# Patient Record
Sex: MALE | Race: WHITE | NOT HISPANIC OR LATINO | URBAN - METROPOLITAN AREA
[De-identification: names, ages, dates, MRNs, and addresses within clinical notes are randomized per-mention and may not be internally consistent; named-entity substitution may affect disease eponyms.]

---

## 2022-12-13 ENCOUNTER — APPOINTMENT (OUTPATIENT)
Dept: RADIOLOGY | Facility: MEDICAL CENTER | Age: 77
End: 2022-12-13
Attending: EMERGENCY MEDICINE
Payer: MEDICARE

## 2022-12-13 ENCOUNTER — HOSPITAL ENCOUNTER (OUTPATIENT)
Facility: MEDICAL CENTER | Age: 77
End: 2022-12-14
Attending: EMERGENCY MEDICINE | Admitting: INTERNAL MEDICINE
Payer: MEDICARE

## 2022-12-13 ENCOUNTER — APPOINTMENT (OUTPATIENT)
Dept: RADIOLOGY | Facility: MEDICAL CENTER | Age: 77
End: 2022-12-13
Payer: MEDICARE

## 2022-12-13 DIAGNOSIS — J44.9 CHRONIC OBSTRUCTIVE PULMONARY DISEASE, UNSPECIFIED COPD TYPE (HCC): ICD-10-CM

## 2022-12-13 DIAGNOSIS — R52 PAIN: ICD-10-CM

## 2022-12-13 DIAGNOSIS — H04.123 DRY EYES, BILATERAL: ICD-10-CM

## 2022-12-13 DIAGNOSIS — R11.0 NAUSEA: ICD-10-CM

## 2022-12-13 DIAGNOSIS — I10 PRIMARY HYPERTENSION: ICD-10-CM

## 2022-12-13 DIAGNOSIS — R41.0 CONFUSION: ICD-10-CM

## 2022-12-13 DIAGNOSIS — C85.90 LYMPHOMA, UNSPECIFIED BODY REGION, UNSPECIFIED LYMPHOMA TYPE (HCC): ICD-10-CM

## 2022-12-13 PROBLEM — R41.82 ALTERED MENTAL STATE: Status: ACTIVE | Noted: 2022-12-13

## 2022-12-13 PROBLEM — R41.82 ALTERED MENTAL STATUS: Status: ACTIVE | Noted: 2022-12-13

## 2022-12-13 PROBLEM — R09.02 HYPOXIA: Status: ACTIVE | Noted: 2022-12-13

## 2022-12-13 LAB
ABO + RH BLD: NORMAL
ABO GROUP BLD: NORMAL
ALBUMIN SERPL BCP-MCNC: 3.7 G/DL (ref 3.2–4.9)
ALBUMIN/GLOB SERPL: 1.4 G/DL
ALP SERPL-CCNC: 74 U/L (ref 30–99)
ALT SERPL-CCNC: 30 U/L (ref 2–50)
AMPHET UR QL SCN: NEGATIVE
ANION GAP SERPL CALC-SCNC: 8 MMOL/L (ref 7–16)
APPEARANCE UR: CLEAR
APTT PPP: 36.3 SEC (ref 24.7–36)
AST SERPL-CCNC: 36 U/L (ref 12–45)
BARBITURATES UR QL SCN: NEGATIVE
BASE EXCESS BLDA CALC-SCNC: 3 MMOL/L (ref -4–3)
BASOPHILS # BLD AUTO: 0.2 % (ref 0–1.8)
BASOPHILS # BLD: 0.02 K/UL (ref 0–0.12)
BENZODIAZ UR QL SCN: NEGATIVE
BILIRUB SERPL-MCNC: 0.2 MG/DL (ref 0.1–1.5)
BILIRUB UR QL STRIP.AUTO: NEGATIVE
BLD GP AB SCN SERPL QL: NORMAL
BODY TEMPERATURE: 37 CENTIGRADE
BUN SERPL-MCNC: 14 MG/DL (ref 8–22)
BZE UR QL SCN: NEGATIVE
CALCIUM ALBUM COR SERPL-MCNC: 9.5 MG/DL (ref 8.5–10.5)
CALCIUM SERPL-MCNC: 9.3 MG/DL (ref 8.5–10.5)
CANNABINOIDS UR QL SCN: POSITIVE
CHLORIDE SERPL-SCNC: 98 MMOL/L (ref 96–112)
CO2 SERPL-SCNC: 28 MMOL/L (ref 20–33)
COLOR UR: YELLOW
CREAT SERPL-MCNC: 0.75 MG/DL (ref 0.5–1.4)
EKG IMPRESSION: NORMAL
EOSINOPHIL # BLD AUTO: 0.01 K/UL (ref 0–0.51)
EOSINOPHIL NFR BLD: 0.1 % (ref 0–6.9)
ERYTHROCYTE [DISTWIDTH] IN BLOOD BY AUTOMATED COUNT: 58.2 FL (ref 35.9–50)
ETHANOL BLD-MCNC: <10.1 MG/DL
ETHANOL BLD-MCNC: <10.1 MG/DL
GFR SERPLBLD CREATININE-BSD FMLA CKD-EPI: 93 ML/MIN/1.73 M 2
GLOBULIN SER CALC-MCNC: 2.7 G/DL (ref 1.9–3.5)
GLUCOSE BLD STRIP.AUTO-MCNC: 101 MG/DL (ref 65–99)
GLUCOSE SERPL-MCNC: 122 MG/DL (ref 65–99)
GLUCOSE UR STRIP.AUTO-MCNC: NEGATIVE MG/DL
HCO3 BLDA-SCNC: 28 MMOL/L (ref 17–25)
HCT VFR BLD AUTO: 31.2 % (ref 42–52)
HGB BLD-MCNC: 10.4 G/DL (ref 14–18)
IMM GRANULOCYTES # BLD AUTO: 0.12 K/UL (ref 0–0.11)
IMM GRANULOCYTES NFR BLD AUTO: 1.3 % (ref 0–0.9)
INR PPP: 1.06 (ref 0.87–1.13)
INR PPP: 1.64 (ref 0.87–1.13)
KETONES UR STRIP.AUTO-MCNC: ABNORMAL MG/DL
LACTATE SERPL-SCNC: 0.9 MMOL/L (ref 0.5–2)
LACTATE SERPL-SCNC: 1 MMOL/L (ref 0.5–2)
LEUKOCYTE ESTERASE UR QL STRIP.AUTO: NEGATIVE
LYMPHOCYTES # BLD AUTO: 0.56 K/UL (ref 1–4.8)
LYMPHOCYTES NFR BLD: 6.2 % (ref 22–41)
MCH RBC QN AUTO: 32.5 PG (ref 27–33)
MCHC RBC AUTO-ENTMCNC: 33.3 G/DL (ref 33.7–35.3)
MCV RBC AUTO: 97.5 FL (ref 81.4–97.8)
METHADONE UR QL SCN: NEGATIVE
MICRO URNS: ABNORMAL
MONOCYTES # BLD AUTO: 0.81 K/UL (ref 0–0.85)
MONOCYTES NFR BLD AUTO: 8.9 % (ref 0–13.4)
NEUTROPHILS # BLD AUTO: 7.55 K/UL (ref 1.82–7.42)
NEUTROPHILS NFR BLD: 83.3 % (ref 44–72)
NITRITE UR QL STRIP.AUTO: NEGATIVE
NRBC # BLD AUTO: 0.06 K/UL
NRBC BLD-RTO: 0.7 /100 WBC
OPIATES UR QL SCN: POSITIVE
OXYCODONE UR QL SCN: NEGATIVE
PCO2 BLDA: 46.2 MMHG (ref 26–37)
PCP UR QL SCN: NEGATIVE
PH BLDA: 7.4 [PH] (ref 7.4–7.5)
PH UR STRIP.AUTO: 5 [PH] (ref 5–8)
PLATELET # BLD AUTO: 104 K/UL (ref 164–446)
PMV BLD AUTO: 9.9 FL (ref 9–12.9)
PO2 BLDA: 38.1 MMHG (ref 64–87)
POTASSIUM SERPL-SCNC: 4.2 MMOL/L (ref 3.6–5.5)
PROCALCITONIN SERPL-MCNC: 0.11 NG/ML
PROPOXYPH UR QL SCN: NEGATIVE
PROT SERPL-MCNC: 6.4 G/DL (ref 6–8.2)
PROT UR QL STRIP: NEGATIVE MG/DL
PROTHROMBIN TIME: 13.7 SEC (ref 12–14.6)
PROTHROMBIN TIME: 19 SEC (ref 12–14.6)
RBC # BLD AUTO: 3.2 M/UL (ref 4.7–6.1)
RBC UR QL AUTO: NEGATIVE
RH BLD: NORMAL
SAO2 % BLDA: 69.2 % (ref 93–99)
SODIUM SERPL-SCNC: 134 MMOL/L (ref 135–145)
SP GR UR STRIP.AUTO: >=1.045
TROPONIN T SERPL-MCNC: 49 NG/L (ref 6–19)
TROPONIN T SERPL-MCNC: 49 NG/L (ref 6–19)
UROBILINOGEN UR STRIP.AUTO-MCNC: 0.2 MG/DL
WBC # BLD AUTO: 9.1 K/UL (ref 4.8–10.8)

## 2022-12-13 PROCEDURE — 85025 COMPLETE CBC W/AUTO DIFF WBC: CPT

## 2022-12-13 PROCEDURE — 84145 PROCALCITONIN (PCT): CPT

## 2022-12-13 PROCEDURE — 82077 ASSAY SPEC XCP UR&BREATH IA: CPT | Mod: 91

## 2022-12-13 PROCEDURE — 83605 ASSAY OF LACTIC ACID: CPT

## 2022-12-13 PROCEDURE — 70498 CT ANGIOGRAPHY NECK: CPT

## 2022-12-13 PROCEDURE — 700102 HCHG RX REV CODE 250 W/ 637 OVERRIDE(OP): Performed by: INTERNAL MEDICINE

## 2022-12-13 PROCEDURE — 82803 BLOOD GASES ANY COMBINATION: CPT

## 2022-12-13 PROCEDURE — 0042T CT-CEREBRAL PERFUSION ANALYSIS: CPT

## 2022-12-13 PROCEDURE — 82962 GLUCOSE BLOOD TEST: CPT

## 2022-12-13 PROCEDURE — 95816 EEG AWAKE AND DROWSY: CPT | Performed by: STUDENT IN AN ORGANIZED HEALTH CARE EDUCATION/TRAINING PROGRAM

## 2022-12-13 PROCEDURE — 86850 RBC ANTIBODY SCREEN: CPT

## 2022-12-13 PROCEDURE — 85730 THROMBOPLASTIN TIME PARTIAL: CPT

## 2022-12-13 PROCEDURE — G0378 HOSPITAL OBSERVATION PER HR: HCPCS

## 2022-12-13 PROCEDURE — 87040 BLOOD CULTURE FOR BACTERIA: CPT | Mod: 91

## 2022-12-13 PROCEDURE — 700117 HCHG RX CONTRAST REV CODE 255: Performed by: EMERGENCY MEDICINE

## 2022-12-13 PROCEDURE — 70450 CT HEAD/BRAIN W/O DYE: CPT

## 2022-12-13 PROCEDURE — 86901 BLOOD TYPING SEROLOGIC RH(D): CPT

## 2022-12-13 PROCEDURE — 84484 ASSAY OF TROPONIN QUANT: CPT

## 2022-12-13 PROCEDURE — 71045 X-RAY EXAM CHEST 1 VIEW: CPT

## 2022-12-13 PROCEDURE — 36415 COLL VENOUS BLD VENIPUNCTURE: CPT

## 2022-12-13 PROCEDURE — 99285 EMERGENCY DEPT VISIT HI MDM: CPT

## 2022-12-13 PROCEDURE — 95816 EEG AWAKE AND DROWSY: CPT | Mod: 26 | Performed by: STUDENT IN AN ORGANIZED HEALTH CARE EDUCATION/TRAINING PROGRAM

## 2022-12-13 PROCEDURE — 70496 CT ANGIOGRAPHY HEAD: CPT

## 2022-12-13 PROCEDURE — 99220 PR INITIAL OBSERVATION CARE,LEVL III: CPT | Performed by: INTERNAL MEDICINE

## 2022-12-13 PROCEDURE — A9270 NON-COVERED ITEM OR SERVICE: HCPCS | Performed by: INTERNAL MEDICINE

## 2022-12-13 PROCEDURE — 81003 URINALYSIS AUTO W/O SCOPE: CPT | Mod: XU

## 2022-12-13 PROCEDURE — 80307 DRUG TEST PRSMV CHEM ANLYZR: CPT

## 2022-12-13 PROCEDURE — 86900 BLOOD TYPING SEROLOGIC ABO: CPT

## 2022-12-13 PROCEDURE — 80053 COMPREHEN METABOLIC PANEL: CPT

## 2022-12-13 PROCEDURE — 93005 ELECTROCARDIOGRAM TRACING: CPT | Performed by: EMERGENCY MEDICINE

## 2022-12-13 PROCEDURE — 85610 PROTHROMBIN TIME: CPT

## 2022-12-13 RX ORDER — DILTIAZEM HYDROCHLORIDE 240 MG/1
240 CAPSULE, COATED, EXTENDED RELEASE ORAL DAILY
Status: DISCONTINUED | OUTPATIENT
Start: 2022-12-13 | End: 2022-12-14 | Stop reason: HOSPADM

## 2022-12-13 RX ORDER — HYDROCODONE BITARTRATE AND ACETAMINOPHEN 10; 325 MG/1; MG/1
1-2 TABLET ORAL EVERY 6 HOURS PRN
Status: DISCONTINUED | OUTPATIENT
Start: 2022-12-13 | End: 2022-12-14 | Stop reason: HOSPADM

## 2022-12-13 RX ORDER — ESCITALOPRAM OXALATE 10 MG/1
10 TABLET ORAL DAILY
COMMUNITY

## 2022-12-13 RX ORDER — ACETAMINOPHEN 500 MG
1000 TABLET ORAL EVERY 6 HOURS PRN
Status: ON HOLD | COMMUNITY
End: 2022-12-14

## 2022-12-13 RX ORDER — POLYETHYLENE GLYCOL 3350 17 G/17G
1 POWDER, FOR SOLUTION ORAL
Status: DISCONTINUED | OUTPATIENT
Start: 2022-12-13 | End: 2022-12-14 | Stop reason: HOSPADM

## 2022-12-13 RX ORDER — DILTIAZEM HYDROCHLORIDE 240 MG/1
240 CAPSULE, COATED, EXTENDED RELEASE ORAL DAILY
Status: DISCONTINUED | OUTPATIENT
Start: 2022-12-14 | End: 2022-12-13

## 2022-12-13 RX ORDER — TIOTROPIUM BROMIDE AND OLODATEROL 3.124; 2.736 UG/1; UG/1
2 SPRAY, METERED RESPIRATORY (INHALATION) DAILY
COMMUNITY

## 2022-12-13 RX ORDER — ACETAMINOPHEN 325 MG/1
650 TABLET ORAL EVERY 6 HOURS PRN
Status: DISCONTINUED | OUTPATIENT
Start: 2022-12-13 | End: 2022-12-14 | Stop reason: HOSPADM

## 2022-12-13 RX ORDER — ONDANSETRON 8 MG/1
8 TABLET, ORALLY DISINTEGRATING ORAL EVERY 8 HOURS PRN
Status: ON HOLD | COMMUNITY
End: 2022-12-14

## 2022-12-13 RX ORDER — BUDESONIDE AND FORMOTEROL FUMARATE DIHYDRATE 80; 4.5 UG/1; UG/1
2 AEROSOL RESPIRATORY (INHALATION)
Status: DISCONTINUED | OUTPATIENT
Start: 2022-12-13 | End: 2022-12-14 | Stop reason: HOSPADM

## 2022-12-13 RX ORDER — DIGOXIN 125 MCG
125 TABLET ORAL DAILY
COMMUNITY

## 2022-12-13 RX ORDER — ATORVASTATIN CALCIUM 40 MG/1
40 TABLET, FILM COATED ORAL NIGHTLY
Status: DISCONTINUED | OUTPATIENT
Start: 2022-12-13 | End: 2022-12-14 | Stop reason: HOSPADM

## 2022-12-13 RX ORDER — ALBUTEROL SULFATE 90 UG/1
1-2 AEROSOL, METERED RESPIRATORY (INHALATION) EVERY 4 HOURS PRN
Status: DISCONTINUED | OUTPATIENT
Start: 2022-12-13 | End: 2022-12-14 | Stop reason: HOSPADM

## 2022-12-13 RX ORDER — ALBUTEROL SULFATE 90 UG/1
1-2 AEROSOL, METERED RESPIRATORY (INHALATION) EVERY 4 HOURS PRN
Status: ON HOLD | COMMUNITY
End: 2022-12-14

## 2022-12-13 RX ORDER — DABIGATRAN ETEXILATE 150 MG/1
150 CAPSULE ORAL 2 TIMES DAILY
Status: ON HOLD | COMMUNITY
End: 2022-12-14

## 2022-12-13 RX ORDER — DIGOXIN 125 MCG
125 TABLET ORAL DAILY
Status: DISCONTINUED | OUTPATIENT
Start: 2022-12-13 | End: 2022-12-14 | Stop reason: HOSPADM

## 2022-12-13 RX ORDER — DIGOXIN 125 MCG
125 TABLET ORAL DAILY
Status: DISCONTINUED | OUTPATIENT
Start: 2022-12-14 | End: 2022-12-13

## 2022-12-13 RX ORDER — ONDANSETRON 2 MG/ML
4 INJECTION INTRAMUSCULAR; INTRAVENOUS EVERY 4 HOURS PRN
Status: DISCONTINUED | OUTPATIENT
Start: 2022-12-13 | End: 2022-12-14 | Stop reason: HOSPADM

## 2022-12-13 RX ORDER — DILTIAZEM HYDROCHLORIDE 240 MG/1
240 CAPSULE, COATED, EXTENDED RELEASE ORAL DAILY
Status: ON HOLD | COMMUNITY
End: 2022-12-14

## 2022-12-13 RX ORDER — HYDROCODONE BITARTRATE AND ACETAMINOPHEN 10; 325 MG/1; MG/1
1-2 TABLET ORAL EVERY 6 HOURS PRN
Status: ON HOLD | COMMUNITY
End: 2022-12-14

## 2022-12-13 RX ORDER — ESCITALOPRAM OXALATE 10 MG/1
10 TABLET ORAL DAILY
Status: DISCONTINUED | OUTPATIENT
Start: 2022-12-14 | End: 2022-12-14 | Stop reason: HOSPADM

## 2022-12-13 RX ORDER — AMOXICILLIN 250 MG
2 CAPSULE ORAL 2 TIMES DAILY
Status: DISCONTINUED | OUTPATIENT
Start: 2022-12-13 | End: 2022-12-14 | Stop reason: HOSPADM

## 2022-12-13 RX ORDER — BISACODYL 10 MG
10 SUPPOSITORY, RECTAL RECTAL
Status: DISCONTINUED | OUTPATIENT
Start: 2022-12-13 | End: 2022-12-14 | Stop reason: HOSPADM

## 2022-12-13 RX ORDER — ENOXAPARIN SODIUM 100 MG/ML
40 INJECTION SUBCUTANEOUS DAILY
Status: DISCONTINUED | OUTPATIENT
Start: 2022-12-13 | End: 2022-12-13

## 2022-12-13 RX ORDER — ATORVASTATIN CALCIUM 40 MG/1
40 TABLET, FILM COATED ORAL NIGHTLY
COMMUNITY

## 2022-12-13 RX ORDER — DABIGATRAN ETEXILATE 150 MG/1
150 CAPSULE ORAL 2 TIMES DAILY
Status: DISCONTINUED | OUTPATIENT
Start: 2022-12-13 | End: 2022-12-13

## 2022-12-13 RX ORDER — ONDANSETRON 4 MG/1
4 TABLET, ORALLY DISINTEGRATING ORAL EVERY 4 HOURS PRN
Status: DISCONTINUED | OUTPATIENT
Start: 2022-12-13 | End: 2022-12-14 | Stop reason: HOSPADM

## 2022-12-13 RX ORDER — DABIGATRAN ETEXILATE 150 MG/1
150 CAPSULE ORAL 2 TIMES DAILY
Status: DISCONTINUED | OUTPATIENT
Start: 2022-12-13 | End: 2022-12-14 | Stop reason: HOSPADM

## 2022-12-13 RX ADMIN — DABIGATRAN ETEXILATE MESYLATE 150 MG: 150 CAPSULE ORAL at 19:50

## 2022-12-13 RX ADMIN — ATORVASTATIN CALCIUM 40 MG: 40 TABLET, FILM COATED ORAL at 21:30

## 2022-12-13 RX ADMIN — DILTIAZEM HYDROCHLORIDE 240 MG: 240 CAPSULE, COATED, EXTENDED RELEASE ORAL at 21:30

## 2022-12-13 RX ADMIN — IOHEXOL 40 ML: 350 INJECTION, SOLUTION INTRAVENOUS at 13:49

## 2022-12-13 RX ADMIN — HYDROCODONE BITARTRATE AND ACETAMINOPHEN 1 TABLET: 10; 325 TABLET ORAL at 19:51

## 2022-12-13 RX ADMIN — IOHEXOL 100 ML: 350 INJECTION, SOLUTION INTRAVENOUS at 13:46

## 2022-12-13 RX ADMIN — DIGOXIN 125 MCG: 0.12 TABLET ORAL at 21:31

## 2022-12-13 ASSESSMENT — ENCOUNTER SYMPTOMS
CARDIOVASCULAR NEGATIVE: 1
FEVER: 0
RESPIRATORY NEGATIVE: 1
CHILLS: 0

## 2022-12-13 ASSESSMENT — LIFESTYLE VARIABLES
TOTAL SCORE: 0
ALCOHOL_USE: NO
EVER HAD A DRINK FIRST THING IN THE MORNING TO STEADY YOUR NERVES TO GET RID OF A HANGOVER: NO
EVER FELT BAD OR GUILTY ABOUT YOUR DRINKING: NO
DOES PATIENT WANT TO STOP DRINKING: NO
TOTAL SCORE: 0
ON A TYPICAL DAY WHEN YOU DRINK ALCOHOL HOW MANY DRINKS DO YOU HAVE: 0
HAVE PEOPLE ANNOYED YOU BY CRITICIZING YOUR DRINKING: NO
AVERAGE NUMBER OF DAYS PER WEEK YOU HAVE A DRINK CONTAINING ALCOHOL: 0
HOW MANY TIMES IN THE PAST YEAR HAVE YOU HAD 5 OR MORE DRINKS IN A DAY: 0
CONSUMPTION TOTAL: NEGATIVE
HAVE YOU EVER FELT YOU SHOULD CUT DOWN ON YOUR DRINKING: NO
TOTAL SCORE: 0

## 2022-12-13 ASSESSMENT — PAIN DESCRIPTION - PAIN TYPE
TYPE: ACUTE PAIN
TYPE: ACUTE PAIN

## 2022-12-13 ASSESSMENT — PATIENT HEALTH QUESTIONNAIRE - PHQ9
SUM OF ALL RESPONSES TO PHQ9 QUESTIONS 1 AND 2: 0
1. LITTLE INTEREST OR PLEASURE IN DOING THINGS: NOT AT ALL
2. FEELING DOWN, DEPRESSED, IRRITABLE, OR HOPELESS: NOT AT ALL

## 2022-12-13 ASSESSMENT — FIBROSIS 4 INDEX: FIB4 SCORE: 4.87

## 2022-12-13 NOTE — ED PROVIDER NOTES
ER Provider Note    Scribed for FERNANDEZ SUE by Marylin Anderson. 12/13/2022  12:13 PM    Primary Care Provider: None  Means of Arrival:  EMS  History obtained from: patient      CHIEF COMPLAINT  Chief Complaint   Patient presents with    ALOC     Pt BIB EMS for altered loc.  Currently AxO x1  Typically wears Home O2-3L  Last night wife noticed patient get up to use restroom without O2 on. This morning O2 sat on their home monitor in upper 70's per wife. They increased home O2 to 6L and brought saturation up to high 80's upon EMS arrival.        LIMITATION TO HISTORY   Select: Altered mental status / Confusion    HPI  Aidan Diallo is a 77 y.o. male who presents to the ED complaining of altered level of consciousness. Patient woke up this morning around 9 am which is when his wife noticed he was confused. He was last seen normal last night. Patient is typically alert and oriented. Nursing spoke to patients wife who states he has a history of low oxygen saturation when he travels to places of high elevation. [place] visiting [facility] [place] and wife noticed last night he went to the bathroom and did not have his oxygen on. It is unclear if they put his oxygen back on at this time, but when he woke up and his wife checked his oxygen saturation she found it to be in the upper 70% range. His oxygen was increased from 3 L to 6 L and they called EMS. Nursing states the patient has been A&O x1. He has a chronic facial droop per wife. He is on oxygen secondary to COPD.      OUTSIDE HISTORIAN(S):  Select: EMS  , Significant other (wife), and nursing        REVIEW OF SYSTEMS  Review of Systems   Unable to perform ROS: Mental status change     PAST MEDICAL HISTORY  Past Medical History:   Diagnosis Date    Cancer (HCC)     Chronic obstructive pulmonary disease (HCC)     Hypertension        SURGICAL HISTORY  None noted when reviewed.     FAMILY HISTORY  None noted when reviewed.    SOCIAL HISTORY    None noted when reviewed.     CURRENT MEDICATIONS  No current outpatient medications     ALLERGIES  Patient has no known allergies.    PHYSICAL EXAM  Physical Exam  Constitutional: Well developed, Well nourished, mild distress, Non-toxic appearance.   HENT: Normocephalic, Large cancerous growth left side of scalp, Bilateral external ears normal, Oropharynx moist, No oral exudates.   Eyes: PERRLA, EOMI, Conjunctiva normal, No discharge.   Neck: No tenderness, Supple, No stridor.   Lymphatic: No lymphadenopathy noted.   Cardiovascular: Normal heart rate, Normal rhythm.   Thorax & Lungs: Clear to auscultation bilaterally, No respiratory distress, No wheezing, No crackles.   Abdomen: Soft, No tenderness, No masses, No pulsatile masses.   Skin: Warm, Dry, No erythema, No rash.   Extremities:, No edema No cyanosis.   Musculoskeletal: No tenderness to palpation or major deformities noted.  Intact distal pulses  Neurologic: Awake alert disoriented left-sided facial droop,   psychiatric: Affect normal, Judgment normal, Mood normal.       VITAL SIGNS:   BP (!) 144/73   Pulse 100   Temp 36.7 °C (98.1 °F) (Temporal)   Resp 17   Ht 1.829 m (6')   Wt 88.5 kg (195 lb)   SpO2 93%   BMI 26.45 kg/m²       DIAGNOSTIC STUDIES      Labs:   Results for orders placed or performed during the hospital encounter of 12/13/22   CBC with Differential   Result Value Ref Range    WBC 9.1 4.8 - 10.8 K/uL    RBC 3.20 (L) 4.70 - 6.10 M/uL    Hemoglobin 10.4 (L) 14.0 - 18.0 g/dL    Hematocrit 31.2 (L) 42.0 - 52.0 %    MCV 97.5 81.4 - 97.8 fL    MCH 32.5 27.0 - 33.0 pg    MCHC 33.3 (L) 33.7 - 35.3 g/dL    RDW 58.2 (H) 35.9 - 50.0 fL    Platelet Count 104 (L) 164 - 446 K/uL    MPV 9.9 9.0 - 12.9 fL    Neutrophils-Polys 83.30 (H) 44.00 - 72.00 %    Lymphocytes 6.20 (L) 22.00 - 41.00 %    Monocytes 8.90 0.00 - 13.40 %    Eosinophils 0.10 0.00 - 6.90 %    Basophils 0.20 0.00 - 1.80 %    Immature Granulocytes 1.30 (H) 0.00 - 0.90 %    Nucleated RBC  0.70 /100 WBC    Neutrophils (Absolute) 7.55 (H) 1.82 - 7.42 K/uL    Lymphs (Absolute) 0.56 (L) 1.00 - 4.80 K/uL    Monos (Absolute) 0.81 0.00 - 0.85 K/uL    Eos (Absolute) 0.01 0.00 - 0.51 K/uL    Baso (Absolute) 0.02 0.00 - 0.12 K/uL    Immature Granulocytes (abs) 0.12 (H) 0.00 - 0.11 K/uL    NRBC (Absolute) 0.06 K/uL   Comp Metabolic Panel   Result Value Ref Range    Sodium 134 (L) 135 - 145 mmol/L    Potassium 4.2 3.6 - 5.5 mmol/L    Chloride 98 96 - 112 mmol/L    Co2 28 20 - 33 mmol/L    Anion Gap 8.0 7.0 - 16.0    Glucose 122 (H) 65 - 99 mg/dL    Bun 14 8 - 22 mg/dL    Creatinine 0.75 0.50 - 1.40 mg/dL    Calcium 9.3 8.5 - 10.5 mg/dL    AST(SGOT) 36 12 - 45 U/L    ALT(SGPT) 30 2 - 50 U/L    Alkaline Phosphatase 74 30 - 99 U/L    Total Bilirubin 0.2 0.1 - 1.5 mg/dL    Albumin 3.7 3.2 - 4.9 g/dL    Total Protein 6.4 6.0 - 8.2 g/dL    Globulin 2.7 1.9 - 3.5 g/dL    A-G Ratio 1.4 g/dL   Diagnostic Alcohol   Result Value Ref Range    Diagnostic Alcohol <10.1 <10.1 mg/dL   Urine Drug Screen (Triage)   Result Value Ref Range    Amphetamines Urine Negative Negative    Barbiturates Negative Negative    Benzodiazepines Negative Negative    Cocaine Metabolite Negative Negative    Methadone Negative Negative    Opiates Positive (A) Negative    Oxycodone Negative Negative    Phencyclidine -Pcp Negative Negative    Propoxyphene Negative Negative    Cannabinoid Metab Positive (A) Negative   ESTIMATED GFR   Result Value Ref Range    GFR (CKD-EPI) 93 >60 mL/min/1.73 m 2   CORRECTED CALCIUM   Result Value Ref Range    Correct Calcium 9.5 8.5 - 10.5 mg/dL   PROTHROMBIN TIME   Result Value Ref Range    PT 13.7 12.0 - 14.6 sec    INR 1.06 0.87 - 1.13   APTT   Result Value Ref Range    APTT 36.3 (H) 24.7 - 36.0 sec   TROPONIN   Result Value Ref Range    Troponin T 49 (H) 6 - 19 ng/L   DIAGNOSTIC ALCOHOL   Result Value Ref Range    Diagnostic Alcohol <10.1 <10.1 mg/dL   EKG (NOW)   Result Value Ref Range    Report       [facility] Emergency Dept.    Test Date:  2022  Pt Name:    GENE JADE                 Department: ER  MRN:        [id number]                      Room:       BL 22  Gender:     Male                         Technician: [id number]  :        1945                   Requested By:FERNANDEZ SUE  Order #:    [id number]                    Reading MD: FERNANDEZ SUE MD    Measurements  Intervals                                Axis  Rate:       98                           P:          54  MT:         162                          QRS:        -11  QRSD:       104                          T:          64  QT:         333  QTc:        426    Interpretive Statements  Sinus rhythm  Borderline low voltage, extremity leads  No previous ECG available for comparison  Electronically Signed On 2022 14:15:14 PST by FERNANDEZ SUE MD     POCT glucose device results   Result Value Ref Range    POC Glucose, Blood 101 (H) 65 - 99 mg/dL     All labs reviewed by me.     Radiology:   CT-CEREBRAL PERFUSION ANALYSIS   Final Result      1.  Cerebral blood flow less than 30% likely representing completed infarct = 0 mL.      2.  T Max more than 6 seconds likely representing combination of completed infarct and ischemia = 16 mL.      3.  Mismatched volume likely representing ischemic brain/penumbra = 16.      Please note that the cerebral perfusion was performed on the limited brain tissue around the basal ganglia region. Infarct/ischemia outside the CT perfusion sections can be missed in this study.      CT-CTA NECK WITH & W/O-POST PROCESSING   Final Result      1.  CT angiogram of the neck within normal limits.   2.  Conglomerate right-sided cervical adenopathy suspicious for malignancy.      CT-CTA HEAD WITH & W/O-POST PROCESS   Final Result      1.  No large vessel occlusion, hemodynamically significant stenosis or aneurysm.   2.  Large heterogeneous right scalp and postauricular mass in keeping with  malignancy.      CT-HEAD W/O   Final Result      1.  Large right parietotemporal occipital scalp mass extending posterior to the right ear and in the posterior right upper neck.   2.  No acute intracranial abnormality.         DX-CHEST-PORTABLE (1 VIEW)   Final Result      No acute pulmonary pulmonary abnormality.      MR-BRAIN-W/O    (Results Pending)      The radiologist's interpretation of all radiological studies have been reviewed by me.    COURSE & MEDICAL DECISION MAKING     Nursing notes, vital signs, PMSFSHx reviewed in chart     Differential diagnoses include but not limited to ACS, acute stroke, metabolic, medication reaction, infection, sepsis, intracranial metastasis.    Prior records reviewed which indicate history of hypertension.     DISCUSSION OF MANAGEMENT WITH OTHER PHYSICIANS, QHP OR APPROPRIATE SOURCE  Select: Admitting team          ESCALATION OF CARE  Evaluated by hospitalist who agrees to admit        PLAN   12:16 PM -  Will order chest x-ray, CBC w/ diff, CMP, diagnostic alcohol, UA, urine drug screen to evaluate his complaints.    2:52 PM Paged hospitalist.    3:15 PM - I discussed the patient's case and the above findings with Dr. Sinclair (Hospitalist) who will consult the patient for admission. Patient care will be transferred at this time.    COURSE  Patient is coming in with acute confusion disorientation of uncertain etiology.  Discussed with the possibility of a stroke therefore safe CT scans were performed, this was unremarkable, work-up here I cannot discern where the patient's confusion is coming from.  Patient will need to be hospitalized, discussed case with the hospitalist for hospitalization.    DISPOSITION   Patient will be hospitalized by Dr. Sinclair    CONDITION AT DISPOSITION  Stable     FINAL IMPRESSION   1. Confusion            I, Marylin Anderson (Sabas), am scribing for, and in the presence of, Aamir Craig M.D..    Electronically signed by: Marylin Lucio),  12/13/2022    IAamir M.D. personally performed the services described in this documentation, as scribed by Marylin Anderson in my presence, and it is both accurate and complete.     The note accurately reflects work and decisions made by me.  Aamir Craig M.D.  12/13/2022  4:30 PM

## 2022-12-13 NOTE — ED TRIAGE NOTES
Chief Complaint   Patient presents with    ALOC     Pt BIB EMS for altered loc.  Currently AxO x1  Typically wears Home O2-3L  Last night wife noticed patient get up to use restroom without O2 on. This morning O2 sat on their home monitor in upper 70's per wife. They increased home O2 to 6L and brought saturation up to high 80's upon EMS arrival.

## 2022-12-13 NOTE — H&P
Hospital Medicine History & Physical Note    Date of Service  12/13/2022    Primary Care Physician  No primary care provider on file.    Consultants  None    Code Status  Full Code    Chief Complaint  Chief Complaint   Patient presents with    ALOC     Pt BIB EMS for altered loc.  Currently AxO x1  Typically wears Home O2-3L  Last night wife noticed patient get up to use restroom without O2 on. This morning O2 sat on their home monitor in upper 70's per wife. They increased home O2 to 6L and brought saturation up to high 80's upon EMS arrival.        History of Presenting Illness      77-year-old male with history of emphysema and lymphoma who presented with family on 12/13 with altered mental status.  Patient lives in [place] and using oxygen as needed, patient is visiting our area and family noticed his saturation around 70% on room air and patient was more lethargic with confusing, patient was transferred to our facility.  Patient was alert to himself only however no agitation, patient denied any pain, according to family they did not notice any signs of fever, no chest pain or shortness of breath and no other symptoms, on admission vital signs were stable and patient needed 4 L nasal cannula, labs did not show any leukocytosis however he has left shift.  No kidney injuries and troponin mildly elevated 49, EKG did not show any ischemic changes or arrhythmia, CT scan for head did not show any acute finding, however showed large right  parietotemporal occipital scalp mass extending posterior to the right ear and in the posterior right upper neck.  Related to his lymphoma.  Patient will be admitted for altered mental status work-up.      Patient has lymphoma around a year, patient received chemotherapy, which was last dose 2 weeks ago.    I discussed the plan of care with patient, family, and bedside RN.    Review of Systems  Review of Systems   Unable to perform ROS: Mental status change   Constitutional:   Negative for chills and fever.   Respiratory: Negative.     Cardiovascular: Negative.    Genitourinary: Negative.      Past Medical History   has a past medical history of Cancer (HCC), Chronic obstructive pulmonary disease (HCC), and Hypertension.    Surgical History  Knee replacement    Family History  Denied any family history of heart disease or lymphoma  Family history reviewed with patient. There is no family history that is pertinent to the chief complaint.     Social History       Allergies  No Known Allergies    Medications  None       Physical Exam  Temp:  [36.7 °C (98.1 °F)-36.9 °C (98.5 °F)] 36.9 °C (98.5 °F)  Pulse:  [] 96  Resp:  [14-20] 14  BP: (140-153)/(73-79) 153/79  SpO2:  [93 %-96 %] 95 %  Blood Pressure : (!) 153/79   Temperature: 36.9 °C (98.5 °F)   Pulse: 96   Respiration: 14   Pulse Oximetry: 95 %       Physical Exam  Constitutional:       Appearance: He is not ill-appearing.   HENT:      Head:      Comments: Lymphadenopathy  Eyes:      General: No scleral icterus.  Cardiovascular:      Rate and Rhythm: Normal rate.      Heart sounds: No murmur heard.  Pulmonary:      Effort: No respiratory distress.      Breath sounds: No wheezing.   Abdominal:      General: There is no distension.      Tenderness: There is no abdominal tenderness. There is no right CVA tenderness, left CVA tenderness or guarding.   Musculoskeletal:      Right lower leg: No edema.      Left lower leg: No edema.   Lymphadenopathy:      Cervical: No cervical adenopathy.   Skin:     Coloration: Skin is not jaundiced.      Findings: No bruising, lesion or rash.   Neurological:      General: No focal deficit present.      Mental Status: He is alert. Mental status is at baseline. He is disoriented.      Cranial Nerves: No cranial nerve deficit.      Motor: No weakness.      Gait: Gait normal.      Comments: Alert to himself however confused about date and place       Laboratory:  Recent Labs     12/13/22  1130   WBC 9.1    RBC 3.20*   HEMOGLOBIN 10.4*   HEMATOCRIT 31.2*   MCV 97.5   MCH 32.5   MCHC 33.3*   RDW 58.2*   PLATELETCT 104*   MPV 9.9     Recent Labs     12/13/22  1130   SODIUM 134*   POTASSIUM 4.2   CHLORIDE 98   CO2 28   GLUCOSE 122*   BUN 14   CREATININE 0.75   CALCIUM 9.3     Recent Labs     12/13/22  1130   ALTSGPT 30   ASTSGOT 36   ALKPHOSPHAT 74   TBILIRUBIN 0.2   GLUCOSE 122*     Recent Labs     12/13/22  1130   APTT 36.3*   INR 1.06     No results for input(s): NTPROBNP in the last 72 hours.      Recent Labs     12/13/22  1130   TROPONINT 49*       Imaging:  CT-CEREBRAL PERFUSION ANALYSIS   Final Result      1.  Cerebral blood flow less than 30% likely representing completed infarct = 0 mL.      2.  T Max more than 6 seconds likely representing combination of completed infarct and ischemia = 16 mL.      3.  Mismatched volume likely representing ischemic brain/penumbra = 16.      Please note that the cerebral perfusion was performed on the limited brain tissue around the basal ganglia region. Infarct/ischemia outside the CT perfusion sections can be missed in this study.      CT-CTA NECK WITH & W/O-POST PROCESSING   Final Result      1.  CT angiogram of the neck within normal limits.   2.  Conglomerate right-sided cervical adenopathy suspicious for malignancy.      CT-CTA HEAD WITH & W/O-POST PROCESS   Final Result      1.  No large vessel occlusion, hemodynamically significant stenosis or aneurysm.   2.  Large heterogeneous right scalp and postauricular mass in keeping with malignancy.      CT-HEAD W/O   Final Result      1.  Large right parietotemporal occipital scalp mass extending posterior to the right ear and in the posterior right upper neck.   2.  No acute intracranial abnormality.         DX-CHEST-PORTABLE (1 VIEW)   Final Result      No acute pulmonary pulmonary abnormality.      MR-BRAIN-W/O    (Results Pending)       X-Ray:  I have personally reviewed the images and compared with prior images.  EKG:  I  have personally reviewed the images and compared with prior images.    Assessment/Plan:  Justification for Admission Status  I anticipate this patient is appropriate for observation status at this time because altered mental status    Patient will need a Med/Surg bed on cardiology service .  The need is secondary to altered mental status and possible stroke.    * Altered mental status- (present on admission)  Assessment & Plan  Patient is alert to himself only, confusing however no agitation  No signs of infection or sepsis  CT scan of her head did not show an acute finding  Will order blood culture and procalcitonin and UA to rule out infection  Could be related to hypoxia  EEG and MRI to rule out any stroke  Continue nonpharmacological treatment to avoid worsening agitation    COPD (chronic obstructive pulmonary disease) ([facility])  Assessment & Plan  Chest x-ray did not show any infiltration, no wheezing or exacerbation  Inhalers as needed  Oxygen therapy    Primary hypertension  Assessment & Plan  Blood pressure uncontrolled, possible related to stress patient is not on any medications,   continue monitoring and start with blood pressure medication if needed      Hypoxia  Assessment & Plan  Patient has COPD  Patient usually uses oxygen as needed  Patient was noticed to have hypoxia 70%  Patient is 4 L nasal cannula  No COPD exacerbation however continue inhalers and oxygen therapy  Patient might need oxygen therapy 24/7    Lymphoma ([facility])  Assessment & Plan  Large B-cell lymphoma  Diagnosed a month ago, patient had remission in June however it came back recently  Recent chemotherapy 2 weeks ago  Follow-up with oncologist as outpatient   CBC around baseline  Rule out infection      VTE prophylaxis: SCDs/TEDs and enoxaparin ppx      Interventions to be considered in all patients in order to minimize the risk of delirium.   -do not disturb patient (vitals or lab draws) between the hours of 10 PM and 6 AM.  -ideally the  patient should not sleep during the day and we should avoid day time naps.   -up in chair for meals  -ambulate at least three times daily, as able  -watch for constipation  -timed voiding - ask patient is she would like to go to the bathroom q 2-3 hours, except during the do not disturb hours.   -remove all necessary lines (central lines, peripheral IVs, feeding tubes, arias catheters)  -unless patient has shown harm to self or others I would recommend against use of restraints - either chemical or physical (antipsychotics)   -minimize polypharmacy, do not dose medication during sleep hours

## 2022-12-13 NOTE — ED NOTES
Lilia- daughter of gene, called his cell phone. With patient's permission we answered it together for some clarification.   Lilia # [phone]    Lilia states patient's normal is AxOx4. Reaffirmed that patient's last known normal was last night 12/12.  Family has been in the area on vacation since Sunday.   I asked the family to come to [facility] provide answers for any additional questions. They will be here around 1400.

## 2022-12-13 NOTE — ED NOTES
UNABLE to address med rec at this time  Pt unable to participate in interview, unable to reach pt's family, no pharmacy on file

## 2022-12-13 NOTE — ED NOTES
Reoriented patient to time, place, and situation.   Swabbed patient's mouth and applied chapstick to lips.   Patient quickly falls back asleep without constant stimulation.  Patient is easily rousable and able to answer questions if they are one at a time but sometimes unable to find the right words.

## 2022-12-14 ENCOUNTER — PHARMACY VISIT (OUTPATIENT)
Dept: PHARMACY | Facility: MEDICAL CENTER | Age: 77
End: 2022-12-14
Payer: COMMERCIAL

## 2022-12-14 VITALS
HEIGHT: 72 IN | HEART RATE: 77 BPM | DIASTOLIC BLOOD PRESSURE: 67 MMHG | WEIGHT: 181.88 LBS | RESPIRATION RATE: 18 BRPM | SYSTOLIC BLOOD PRESSURE: 116 MMHG | BODY MASS INDEX: 24.63 KG/M2 | TEMPERATURE: 98.1 F | OXYGEN SATURATION: 96 %

## 2022-12-14 PROBLEM — R09.02 HYPOXIA: Status: RESOLVED | Noted: 2022-12-13 | Resolved: 2022-12-14

## 2022-12-14 PROBLEM — R41.82 ALTERED MENTAL STATUS: Status: RESOLVED | Noted: 2022-12-13 | Resolved: 2022-12-14

## 2022-12-14 PROBLEM — R41.82 ALTERED MENTAL STATE: Status: RESOLVED | Noted: 2022-12-13 | Resolved: 2022-12-14

## 2022-12-14 LAB
ALBUMIN SERPL BCP-MCNC: 3.3 G/DL (ref 3.2–4.9)
ALBUMIN/GLOB SERPL: 1.4 G/DL
ALP SERPL-CCNC: 64 U/L (ref 30–99)
ALT SERPL-CCNC: 24 U/L (ref 2–50)
ANION GAP SERPL CALC-SCNC: 8 MMOL/L (ref 7–16)
AST SERPL-CCNC: 31 U/L (ref 12–45)
BASOPHILS # BLD AUTO: 0.3 % (ref 0–1.8)
BASOPHILS # BLD: 0.02 K/UL (ref 0–0.12)
BILIRUB SERPL-MCNC: 0.3 MG/DL (ref 0.1–1.5)
BUN SERPL-MCNC: 12 MG/DL (ref 8–22)
CALCIUM ALBUM COR SERPL-MCNC: 9.4 MG/DL (ref 8.5–10.5)
CALCIUM SERPL-MCNC: 8.8 MG/DL (ref 8.5–10.5)
CHLORIDE SERPL-SCNC: 99 MMOL/L (ref 96–112)
CO2 SERPL-SCNC: 29 MMOL/L (ref 20–33)
CREAT SERPL-MCNC: 0.64 MG/DL (ref 0.5–1.4)
CRP SERPL HS-MCNC: 1 MG/DL (ref 0–0.75)
D DIMER PPP IA.FEU-MCNC: 0.57 UG/ML (FEU) (ref 0–0.5)
DIGOXIN SERPL-MCNC: 0.8 NG/ML (ref 0.8–2)
EOSINOPHIL # BLD AUTO: 0.06 K/UL (ref 0–0.51)
EOSINOPHIL NFR BLD: 1 % (ref 0–6.9)
ERYTHROCYTE [DISTWIDTH] IN BLOOD BY AUTOMATED COUNT: 57.6 FL (ref 35.9–50)
GFR SERPLBLD CREATININE-BSD FMLA CKD-EPI: 97 ML/MIN/1.73 M 2
GLOBULIN SER CALC-MCNC: 2.4 G/DL (ref 1.9–3.5)
GLUCOSE SERPL-MCNC: 94 MG/DL (ref 65–99)
HCT VFR BLD AUTO: 29.4 % (ref 42–52)
HGB BLD-MCNC: 10 G/DL (ref 14–18)
IMM GRANULOCYTES # BLD AUTO: 0.06 K/UL (ref 0–0.11)
IMM GRANULOCYTES NFR BLD AUTO: 1 % (ref 0–0.9)
LACTATE SERPL-SCNC: 1.1 MMOL/L (ref 0.5–2)
LYMPHOCYTES # BLD AUTO: 0.48 K/UL (ref 1–4.8)
LYMPHOCYTES NFR BLD: 8.2 % (ref 22–41)
MAGNESIUM SERPL-MCNC: 1.9 MG/DL (ref 1.5–2.5)
MCH RBC QN AUTO: 32.7 PG (ref 27–33)
MCHC RBC AUTO-ENTMCNC: 34 G/DL (ref 33.7–35.3)
MCV RBC AUTO: 96.1 FL (ref 81.4–97.8)
MONOCYTES # BLD AUTO: 1.04 K/UL (ref 0–0.85)
MONOCYTES NFR BLD AUTO: 17.7 % (ref 0–13.4)
NEUTROPHILS # BLD AUTO: 4.2 K/UL (ref 1.82–7.42)
NEUTROPHILS NFR BLD: 71.8 % (ref 44–72)
NRBC # BLD AUTO: 0.02 K/UL
NRBC BLD-RTO: 0.3 /100 WBC
PLATELET # BLD AUTO: 110 K/UL (ref 164–446)
PMV BLD AUTO: 10.9 FL (ref 9–12.9)
POTASSIUM SERPL-SCNC: 3.8 MMOL/L (ref 3.6–5.5)
PROT SERPL-MCNC: 5.7 G/DL (ref 6–8.2)
RBC # BLD AUTO: 3.06 M/UL (ref 4.7–6.1)
SODIUM SERPL-SCNC: 136 MMOL/L (ref 135–145)
TROPONIN T SERPL-MCNC: 37 NG/L (ref 6–19)
WBC # BLD AUTO: 5.9 K/UL (ref 4.8–10.8)

## 2022-12-14 PROCEDURE — 83605 ASSAY OF LACTIC ACID: CPT

## 2022-12-14 PROCEDURE — 85379 FIBRIN DEGRADATION QUANT: CPT

## 2022-12-14 PROCEDURE — 80162 ASSAY OF DIGOXIN TOTAL: CPT

## 2022-12-14 PROCEDURE — A9270 NON-COVERED ITEM OR SERVICE: HCPCS | Performed by: INTERNAL MEDICINE

## 2022-12-14 PROCEDURE — 99217 PR OBSERVATION CARE DISCHARGE: CPT | Mod: FS | Performed by: NURSE PRACTITIONER

## 2022-12-14 PROCEDURE — 86140 C-REACTIVE PROTEIN: CPT

## 2022-12-14 PROCEDURE — A9270 NON-COVERED ITEM OR SERVICE: HCPCS | Performed by: NURSE PRACTITIONER

## 2022-12-14 PROCEDURE — 700102 HCHG RX REV CODE 250 W/ 637 OVERRIDE(OP): Performed by: INTERNAL MEDICINE

## 2022-12-14 PROCEDURE — 84484 ASSAY OF TROPONIN QUANT: CPT

## 2022-12-14 PROCEDURE — 94640 AIRWAY INHALATION TREATMENT: CPT

## 2022-12-14 PROCEDURE — 700102 HCHG RX REV CODE 250 W/ 637 OVERRIDE(OP): Performed by: NURSE PRACTITIONER

## 2022-12-14 PROCEDURE — 80053 COMPREHEN METABOLIC PANEL: CPT

## 2022-12-14 PROCEDURE — 85025 COMPLETE CBC W/AUTO DIFF WBC: CPT

## 2022-12-14 PROCEDURE — RXMED WILLOW AMBULATORY MEDICATION CHARGE: Performed by: NURSE PRACTITIONER

## 2022-12-14 PROCEDURE — 83735 ASSAY OF MAGNESIUM: CPT

## 2022-12-14 PROCEDURE — G0378 HOSPITAL OBSERVATION PER HR: HCPCS

## 2022-12-14 RX ORDER — CARBOXYMETHYLCELLULOSE SODIUM 5 MG/ML
1 SOLUTION/ DROPS OPHTHALMIC PRN
Status: DISCONTINUED | OUTPATIENT
Start: 2022-12-14 | End: 2022-12-14 | Stop reason: HOSPADM

## 2022-12-14 RX ORDER — DILTIAZEM HYDROCHLORIDE 240 MG/1
240 CAPSULE, COATED, EXTENDED RELEASE ORAL DAILY
Qty: 30 CAPSULE | Refills: 1 | Status: SHIPPED | OUTPATIENT
Start: 2022-12-15

## 2022-12-14 RX ORDER — BUDESONIDE AND FORMOTEROL FUMARATE DIHYDRATE 80; 4.5 UG/1; UG/1
2 AEROSOL RESPIRATORY (INHALATION) 2 TIMES DAILY
Qty: 10.2 G | Refills: 0 | Status: SHIPPED | OUTPATIENT
Start: 2022-12-14

## 2022-12-14 RX ORDER — ACETAMINOPHEN 325 MG/1
650 TABLET ORAL EVERY 6 HOURS PRN
Qty: 30 TABLET | Refills: 0 | Status: SHIPPED | OUTPATIENT
Start: 2022-12-14

## 2022-12-14 RX ORDER — ALBUTEROL SULFATE 90 UG/1
1-2 AEROSOL, METERED RESPIRATORY (INHALATION) EVERY 4 HOURS PRN
Qty: 8.5 G | Refills: 1 | Status: SHIPPED | OUTPATIENT
Start: 2022-12-14

## 2022-12-14 RX ORDER — CARBOXYMETHYLCELLULOSE SODIUM 5 MG/ML
1 SOLUTION/ DROPS OPHTHALMIC PRN
Qty: 15 ML | Refills: 1 | Status: SHIPPED | OUTPATIENT
Start: 2022-12-14

## 2022-12-14 RX ORDER — DABIGATRAN ETEXILATE 150 MG/1
150 CAPSULE ORAL 2 TIMES DAILY
Qty: 60 CAPSULE | Refills: 1 | Status: SHIPPED | OUTPATIENT
Start: 2022-12-14

## 2022-12-14 RX ORDER — ONDANSETRON 4 MG/1
4 TABLET, ORALLY DISINTEGRATING ORAL EVERY 4 HOURS PRN
Qty: 10 TABLET | Refills: 0 | Status: SHIPPED | OUTPATIENT
Start: 2022-12-14

## 2022-12-14 RX ADMIN — DIGOXIN 125 MCG: 0.12 TABLET ORAL at 11:55

## 2022-12-14 RX ADMIN — DABIGATRAN ETEXILATE MESYLATE 150 MG: 150 CAPSULE ORAL at 05:27

## 2022-12-14 RX ADMIN — TIOTROPIUM BROMIDE INHALATION SPRAY 5 MCG: 3.12 SPRAY, METERED RESPIRATORY (INHALATION) at 07:40

## 2022-12-14 RX ADMIN — HYDROCODONE BITARTRATE AND ACETAMINOPHEN 1 TABLET: 10; 325 TABLET ORAL at 05:28

## 2022-12-14 RX ADMIN — ESCITALOPRAM OXALATE 10 MG: 10 TABLET ORAL at 05:28

## 2022-12-14 RX ADMIN — ACETAMINOPHEN 650 MG: 325 TABLET, FILM COATED ORAL at 13:44

## 2022-12-14 RX ADMIN — DILTIAZEM HYDROCHLORIDE 240 MG: 240 CAPSULE, COATED, EXTENDED RELEASE ORAL at 11:57

## 2022-12-14 RX ADMIN — BUDESONIDE AND FORMOTEROL FUMARATE DIHYDRATE 2 PUFF: 80; 4.5 AEROSOL RESPIRATORY (INHALATION) at 07:41

## 2022-12-14 RX ADMIN — CARBOXYMETHYLCELLULOSE SODIUM 1 DROP: 5 SOLUTION/ DROPS OPHTHALMIC at 10:08

## 2022-12-14 RX ADMIN — SENNOSIDES AND DOCUSATE SODIUM 2 TABLET: 50; 8.6 TABLET ORAL at 05:28

## 2022-12-14 ASSESSMENT — PAIN DESCRIPTION - PAIN TYPE
TYPE: ACUTE PAIN
TYPE: ACUTE PAIN

## 2022-12-14 NOTE — CARE PLAN
The patient is Stable - Low risk of patient condition declining or worsening    Shift Goals  Clinical Goals: Monitor O2 sats  Patient Goals: Rest, go home today  Family Goals: No family present    Progress made toward(s) clinical / shift goals:    Problem: Knowledge Deficit - Standard  Goal: Patient and family/care givers will demonstrate understanding of plan of care, disease process/condition, diagnostic tests and medications  Outcome: Met     Problem: Hemodynamics  Goal: Patient's hemodynamics, fluid balance and neurologic status will be stable or improve  Outcome: Met     Problem: Fluid Volume  Goal: Fluid volume balance will be maintained  Outcome: Met     Problem: Urinary - Renal Perfusion  Goal: Ability to achieve and maintain adequate renal perfusion and functioning will improve  Outcome: Met     Problem: Respiratory  Goal: Patient will achieve/maintain optimum respiratory ventilation and gas exchange  Outcome: Met     Problem: Physical Regulation  Goal: Diagnostic test results will improve  Outcome: Met  Goal: Signs and symptoms of infection will decrease  Outcome: Met     Problem: Pain - Standard  Goal: Alleviation of pain or a reduction in pain to the patient’s comfort goal  Outcome: Met     All tests have been completed. The patient is alert and oriented with oxygen titrating back to baseline. Patient understands plan upon discharge.     Patient is not progressing towards the following goals:

## 2022-12-14 NOTE — PROGRESS NOTES
Tele Monitor Summary    Sinus Rhythm - Sinus Tach  HR: 76 - 100  Rare PVCs, Rare PACs,   .18/.09/.34

## 2022-12-14 NOTE — PROGRESS NOTES
4 Eyes Skin Assessment Completed by BALDO Gomez and BALDO Lauren.    Head lesion to right side of head  Ears WDL  Nose WDL  Mouth WDL  Neck WDL  Breast/Chest WDL  Shoulder Blades WDL  Spine WDL  (R) Arm/Elbow/Hand WDL  (L) Arm/Elbow/Hand WDL  Abdomen WDL  Groin WDL  Scrotum/Coccyx/Buttocks WDL  (R) Leg WDL  (L) Leg WDL  (R) Heel/Foot/Toe WDL  (L) Heel/Foot/Toe WDL          Devices In Places Tele Box, Blood Pressure Cuff, and Pulse Ox      Interventions In Place Pillows and Pressure Redistribution Mattress    Possible Skin Injury No    Pictures Uploaded Into Epic Yes  Wound Consult Placed N/A  RN Wound Prevention Protocol Ordered No

## 2022-12-14 NOTE — PROGRESS NOTES
Assessment completed. Patient is aox4, vital signs stable. Patient and family updated on plan of care, all needs met at this time. Patient sinus rhythm on tele monitor, HR:76. Threaded socks and bed alarm in place. Bed locked and in lowest position. Call light and personal belongings within reach.

## 2022-12-14 NOTE — CARE PLAN
The patient is Stable - Low risk of patient condition declining or worsening    Shift Goals  Clinical Goals: [place] O2 sats, Pending MRI  Patient Goals: rest and pain control    Progress made toward(s) clinical / shift goals:      Problem: Knowledge Deficit - Standard  Goal: Patient and family/care givers will demonstrate understanding of plan of care, disease process/condition, diagnostic tests and medications  Outcome: Progressing     Problem: Pain - Standard  Goal: Alleviation of pain or a reduction in pain to the patient’s comfort goal  Outcome: Progressing    Patient is not progressing towards the following goals:

## 2022-12-14 NOTE — ASSESSMENT & PLAN NOTE
Patient is alert to himself only, confusing however no agitation  No signs of infection or sepsis  CT scan of her head did not show an acute finding  Will order blood culture and procalcitonin and UA to rule out infection  Could be related to hypoxia  EEG and MRI to rule out any stroke  Continue nonpharmacological treatment to avoid worsening agitation

## 2022-12-14 NOTE — DISCHARGE INSTRUCTIONS
Discharge Instructions    Discharged to home by car with relative. Discharged via wheelchair, hospital escort: Yes.  Special equipment needed: Oxygen    Be sure to schedule a follow-up appointment with your primary care doctor or any specialists as instructed.     Discharge Plan:   Influenza Vaccine Indication: Patient Refuses    I understand that a diet low in cholesterol, fat, and sodium is recommended for good health. Unless I have been given specific instructions below for another diet, I accept this instruction as my diet prescription.   Other diet:     Special Instructions: None    -Is this patient being discharged with medication to prevent blood clots?  No    Is patient discharged on Warfarin / Coumadin?   No       Altered Mental Status  Altered mental status most often refers to an abnormal change in your responsiveness and awareness. It can affect your speech, thought, mobility, memory, attention span, or alertness. It can range from slight confusion to complete unresponsiveness (coma). Altered mental status can be a sign of a serious underlying medical condition. Rapid evaluation and medical treatment is necessary for patients having an altered mental status.  CAUSES   Low blood sugar (hypoglycemia) or diabetes.  Severe loss of body fluids (dehydration) or a body salt (electrolyte) imbalance.  A stroke or other neurologic problem, such as dementia or delirium.  A head injury or tumor.  A drug or alcohol overdose.  Exposure to toxins or poisons.  Depression, anxiety, and stress.  A low oxygen level (hypoxia).  An infection.  Blood loss.  Twitching or shaking (seizure).  Heart problems, such as heart attack or heart rhythm problems (arrhythmias).  A body temperature that is too low or too high (hypothermia or hyperthermia).  DIAGNOSIS   A diagnosis is based on your history, symptoms, physical and neurologic examinations, and diagnostic tests. Diagnostic tests may include:  Measurement of your blood  pressure, pulse, breathing, and oxygen levels (vital signs).  Blood tests.  Urine tests.  X-ray exams.  A computerized magnetic scan (magnetic resonance imaging, MRI).  A computerized X-ray scan (computed tomography, CT scan).  TREATMENT   Treatment will depend on the cause. Treatment may include:  Management of an underlying medical or mental health condition.  Critical care or support in the hospital.  HOME CARE INSTRUCTIONS   Only take over-the-counter or prescription medicines for pain, discomfort, or fever as directed by your caregiver.  Manage underlying conditions as directed by your caregiver.  Eat a healthy, well-balanced diet to maintain strength.  Join a support group or prevention program to cope with the condition or trauma that caused the altered mental status. Ask your caregiver to help choose a program that works for you.  Follow up with your caregiver for further examination, therapy, or testing as directed.  SEEK MEDICAL CARE IF:   You feel unwell or have chills.  You or your family notice a change in your behavior or your alertness.  You have trouble following your caregiver's treatment plan.  You have questions or concerns.  SEEK IMMEDIATE MEDICAL CARE IF:   You have a rapid heartbeat or have chest pain.  You have difficulty breathing.  You have a fever.  You have a headache with a stiff neck.  You cough up blood.  You have blood in your urine or stool.  You have severe agitation or confusion.  MAKE SURE YOU:   Understand these instructions.  Will watch your condition.  Will get help right away if you are not doing well or get worse.     This information is not intended to replace advice given to you by your health care provider. Make sure you discuss any questions you have with your health care provider.     Document Released: 06/07/2011 Document Revised: 03/11/2013 Document Reviewed: 02/11/2016  [employer] Patient Education ©2016 [employer]

## 2022-12-14 NOTE — ASSESSMENT & PLAN NOTE
Patient has COPD  Patient usually uses oxygen as needed  Patient was noticed to have hypoxia 70%  Patient is 4 L nasal cannula  No COPD exacerbation however continue inhalers and oxygen therapy  Patient might need oxygen therapy 24/7

## 2022-12-14 NOTE — ASSESSMENT & PLAN NOTE
Chest x-ray did not show any infiltration, no wheezing or exacerbation  Inhalers as needed  Oxygen therapy

## 2022-12-14 NOTE — PROGRESS NOTES
Report received, pt care assumed. Pt arrive to unit. Maryann from lab called to inform of critical lab value of Po2 38.1. MD Yahir notified.

## 2022-12-14 NOTE — ED NOTES
Patient's daughter concerned about Gene not receiving heart medications today. I sent a message to the [facility] to let them know someone is now in the room that's able to answer questions.

## 2022-12-14 NOTE — PROCEDURES
INPATIENT ROUTINE VIDEO ELECTROENCEPHALOGRAM REPORT      REFERRING PROVIDER: Dr. Sinclair    DOS: 12/13/2022     TOTAL RECORDING TIME: 0 hours and 25 minutes of total recording time    INDICATION:  Aidan OBREGON Yoel 77 y.o. male presenting with altered mental status    CURRENT ANTI-SEIZURE MEDICATIONS:   No AEDs    TECHNIQUE: Routine VEEG was set up by a Neurodiagnostic technologist who performed education to the patient and staff. A minimum of 23 electrodes and 23 channel recording was setup and performed by Neurodiagnostic technologist, in accordance with the international 10-20 system. The study was reviewed in bipolar and referential montages. The recording examined the patient in the  awake and drowsy state(s).     DESCRIPTION OF THE RECORD:  The background was continuous, showed shifting hemispheric asymmetry and had a  poorly defined and/or fragmented 7 Hz posterior dominant rhythm . The background was composed of sharply contoured theta and delta with overriding faster frequencies . Reactivity was present. Spontaneous variability was present. State changes were present.   EEG Sleep: N2 sleep architecture was not seen.      ACTIVATION PROCEDURES:   Intermittent Photic stimulation was performed in a stepwise fashion from 1 to 30 Hz and did not elicited any abnormalities on EEG.     ICTAL AND INTERICTAL FINDINGS:   Occasional left sharp waves seen independently over the left frontal and right frontal regions.    Abundant bilateral hemispheric synchronous as well as independent slowing, often occurring in briefl rhythmic or quasi-rhythmic runs at 1.5-2.5 Hz over the left > right hemisphere.    Also noted was subtle attenuation over the right posterior regions.    No definite electrographic or electroclinical seizures.     EKG: Sampling of the EKG recording showed sinus rhythm with PVCs/PACs      EVENTS:  None    INTERPRETATION:   Abnormal video EEG recording in the awake and drowsy state(s):  - Moderate diffuse  background slowing with shifting  asymmetry. These findings are suggestive of diffuse/multifocal cerebral dysfunction and/or underlying structural abnormality. Clinical and radiographic correlation reccommended.   -Occasional sharp waves seen independently over the left frontal, right frontal, or bi-frontal regions suggestive of cortical irritability with a potential increase risk for seizures.   -Abundant bilateral hemispheric synchronous as well as independent slowing, often occurring in brief rhythmic or quasi-rhythmic runs at 1.5-2.5 Hz over the left > right hemispheres. The presence of focal slowing is suggestive of a potential increased risk for seizures arising from either hemisphere.   -Also noted was subtle attenuation over the right posterior region which may reflect cortical dysfunction or be due to dampening of the EEG signal secondary to an intervening extracranial mass underlying the electrodes  -No definite electrographic or electroclinical seizures.       Note: This EEG does not rule the possibility of seizures  If the clinical suspicion remains high for seizures, a prolonged recording to capture clinical or subclinical events may be helpful.        Rex Bradshaw MD  Department of Neurology at [facility]  General Neurologist and Epileptologist  Director of [facility] Level III Comprehensive Epilepsy Program  Professor of Clinical Neurology, [facility].   Phone: [phone]  Fax: [phone]  E-mail: [email or web address]

## 2022-12-14 NOTE — ASSESSMENT & PLAN NOTE
Large B-cell lymphoma  Diagnosed a month ago, patient had remission in June however it came back recently  Recent chemotherapy 2 weeks ago  Follow-up with oncologist as outpatient   CBC around baseline  Rule out infection

## 2022-12-14 NOTE — PROGRESS NOTES
Discharge instructions reviewed and signed by the patient. Awaiting family to bring clothing as well as oxygen concentrator for discharge home.

## 2022-12-14 NOTE — DISCHARGE SUMMARY
Discharge Summary    CHIEF COMPLAINT ON ADMISSION  Chief Complaint   Patient presents with    ALOC     Pt BIB EMS for altered loc.  Currently AxO x1  Typically wears Home O2-3L  Last night wife noticed patient get up to use restroom without O2 on. This morning O2 sat on their home monitor in upper 70's per wife. They increased home O2 to 6L and brought saturation up to high 80's upon EMS arrival.        Reason for Admission  ems     Admission Date  12/13/2022    CODE STATUS  Full Code    HPI & HOSPITAL COURSE  This is a very pleasant 77 y.o. male here with altered mental status.   77-year-old male with history of emphysema and lymphoma who presented with family on 12/13 with altered mental status.  Patient lives in [place] and using oxygen as needed, patient is visiting our area and family noticed his saturation around 70% on room air and patient was more lethargic with confusion,  patient was transferred to our facility.    On arrival in the ED patient was alert to himself only, ABG demonstrated mixed hypercapnia and acute hypoxemia.  He was placed on 4 L nasal cannula.  At the time I arrived this morning to talk to him first thing he was able to give me detailed historical picture of himself medical conditions and interpersonal relationships.     He understands that at this altitude he will need increased oxygen he plans to be picked up from [facility] and be driven by his family straight back over the mountains to [place] he has oxygen concentrator with him for the trip home.          Therefore, he is discharged in good and stable condition to home with close outpatient follow-up.    The patient recovered much more quickly than anticipated on admission.    Discharge Date  12/14/2022    FOLLOW UP ITEMS POST DISCHARGE  None    DISCHARGE DIAGNOSES  Principal Problem (Resolved):    Altered mental status POA: Yes  Active Problems:    Lymphoma (HCC) POA: Unknown    COPD (chronic  obstructive pulmonary disease) (HCC) POA: Unknown    Primary hypertension POA: Unknown  Resolved Problems:    Altered mental state POA: Yes    Hypoxia POA: Unknown      FOLLOW UP  No future appointments.  No follow-up provider specified.    MEDICATIONS ON DISCHARGE     Medication List        START taking these medications        Instructions   budesonide-formoterol 80-4.5 MCG/ACT Aero  Commonly known as: SYMBICORT   Inhale 2 Puffs 2 times a day.  Dose: 2 Puff     carboxymethylcellulose 0.5 % Soln  Commonly known as: REFRESH TEARS   Administer 1 Drop into both eyes as needed (dry eyes).  Dose: 1 Drop            CHANGE how you take these medications        Instructions   acetaminophen 325 MG Tabs  What changed:   medication strength  how much to take  reasons to take this  Commonly known as: Tylenol   Take 2 Tablets by mouth every 6 hours as needed for Moderate Pain or Mild Pain.  Dose: 650 mg     ondansetron 4 MG Tbdp  What changed:   medication strength  how much to take  when to take this  reasons to take this  Commonly known as: ZOFRAN ODT   Take 1 Tablet by mouth every four hours as needed for Nausea/Vomiting (give PO if IV route is unavailable.).  Dose: 4 mg            CONTINUE taking these medications        Instructions   albuterol 108 (90 Base) MCG/ACT Aers inhalation aerosol   Inhale 1-2 Puffs every four hours as needed for Shortness of Breath.  Dose: 1-2 Puff     atorvastatin 40 MG Tabs  Commonly known as: LIPITOR   Take 40 mg by mouth every evening.  Dose: 40 mg     dabigatran 150 MG Caps capsule  Commonly known as: PRADAXA   Take 1 Capsule by mouth 2 times a day.  Dose: 150 mg     digoxin 125 MCG Tabs  Commonly known as: LANOXIN   Take 125 mcg by mouth every day.  Dose: 125 mcg     dilTIAZem  MG Cp24  Start taking on: December 15, 2022  Commonly known as: Cardizem CD   Take 1 Capsule by mouth every day.  Dose: 240 mg     escitalopram 10 MG Tabs  Commonly known as: Lexapro   Take 10 mg by mouth every  day.  Dose: 10 mg     Stiolto Respimat 2.5-2.5 MCG/ACT Aers  Generic drug: Tiotropium Bromide-Olodaterol   Inhale 2 Puffs every day.  Dose: 2 Puff            STOP taking these medications      HYDROcodone/acetaminophen  MG Tabs  Commonly known as: NORCO              Allergies  No Known Allergies    DIET  Orders Placed This Encounter   Procedures    Diet Order Diet: Regular; Tray Modifications (optional): No Sharps/Paperware     Standing Status:   Standing     Number of Occurrences:   1     Order Specific Question:   Diet:     Answer:   Regular [1]     Order Specific Question:   Tray Modifications (optional)     Answer:   No Sharps/Paperware       ACTIVITY  As tolerated.  Weight bearing as tolerated    CONSULTATIONS  None    PROCEDURES  None    LABORATORY  Lab Results   Component Value Date    SODIUM 136 12/14/2022    POTASSIUM 3.8 12/14/2022    CHLORIDE 99 12/14/2022    CO2 29 12/14/2022    GLUCOSE 94 12/14/2022    BUN 12 12/14/2022    CREATININE 0.64 12/14/2022        Lab Results   Component Value Date    WBC 5.9 12/14/2022    HEMOGLOBIN 10.0 (L) 12/14/2022    HEMATOCRIT 29.4 (L) 12/14/2022    PLATELETCT 110 (L) 12/14/2022        Total time of the discharge process was 31 minutes.

## 2022-12-14 NOTE — ASSESSMENT & PLAN NOTE
Blood pressure uncontrolled, possible related to stress patient is not on any medications,   continue monitoring and start with blood pressure medication if needed

## 2022-12-15 NOTE — PROGRESS NOTES
Patient's family arrived with clothing and home oxygen concentrator. Patient dressed, IV removed and [id number] delivered to the patient. Patient and family ready for discharge with no questions. Patient will follow up with home pulmonologist for sleep study. All belongings taken with patient upon departure.

## 2022-12-18 LAB
BACTERIA BLD CULT: NORMAL
BACTERIA BLD CULT: NORMAL
SIGNIFICANT IND 70042: NORMAL
SIGNIFICANT IND 70042: NORMAL
SITE SITE: NORMAL
SITE SITE: NORMAL
SOURCE SOURCE: NORMAL
SOURCE SOURCE: NORMAL